# Patient Record
Sex: MALE | Race: WHITE | URBAN - METROPOLITAN AREA
[De-identification: names, ages, dates, MRNs, and addresses within clinical notes are randomized per-mention and may not be internally consistent; named-entity substitution may affect disease eponyms.]

---

## 2024-07-09 ENCOUNTER — APPOINTMENT (OUTPATIENT)
Dept: PRIMARY CARE | Facility: CLINIC | Age: 29
End: 2024-07-09

## 2024-07-15 ENCOUNTER — APPOINTMENT (OUTPATIENT)
Dept: PRIMARY CARE | Facility: CLINIC | Age: 29
End: 2024-07-15
Payer: COMMERCIAL

## 2024-07-15 VITALS
SYSTOLIC BLOOD PRESSURE: 120 MMHG | WEIGHT: 269.6 LBS | TEMPERATURE: 97.3 F | BODY MASS INDEX: 39.93 KG/M2 | DIASTOLIC BLOOD PRESSURE: 80 MMHG | OXYGEN SATURATION: 98 % | HEIGHT: 69 IN | HEART RATE: 95 BPM

## 2024-07-15 DIAGNOSIS — E66.9 OBESITY (BMI 30-39.9): ICD-10-CM

## 2024-07-15 DIAGNOSIS — Z00.00 ANNUAL PHYSICAL EXAM: Primary | ICD-10-CM

## 2024-07-15 LAB
25(OH)D3 SERPL-MCNC: 28 NG/ML (ref 30–100)
ALBUMIN SERPL BCP-MCNC: 5.2 G/DL (ref 3.4–5)
ALP SERPL-CCNC: 67 U/L (ref 33–120)
ALT SERPL W P-5'-P-CCNC: 29 U/L (ref 10–52)
ANION GAP SERPL CALC-SCNC: 19 MMOL/L (ref 10–20)
AST SERPL W P-5'-P-CCNC: 24 U/L (ref 9–39)
BILIRUB SERPL-MCNC: 1 MG/DL (ref 0–1.2)
BUN SERPL-MCNC: 13 MG/DL (ref 6–23)
CALCIUM SERPL-MCNC: 10 MG/DL (ref 8.6–10.6)
CHLORIDE SERPL-SCNC: 101 MMOL/L (ref 98–107)
CHOLEST SERPL-MCNC: 213 MG/DL (ref 0–199)
CHOLESTEROL/HDL RATIO: 3.6
CO2 SERPL-SCNC: 26 MMOL/L (ref 21–32)
CREAT SERPL-MCNC: 1.01 MG/DL (ref 0.5–1.3)
EGFRCR SERPLBLD CKD-EPI 2021: >90 ML/MIN/1.73M*2
ERYTHROCYTE [DISTWIDTH] IN BLOOD BY AUTOMATED COUNT: 12.1 % (ref 11.5–14.5)
EST. AVERAGE GLUCOSE BLD GHB EST-MCNC: 80 MG/DL
GLUCOSE SERPL-MCNC: 94 MG/DL (ref 74–99)
HBA1C MFR BLD: 4.4 %
HCT VFR BLD AUTO: 46.3 % (ref 41–52)
HCV AB SER QL: NONREACTIVE
HDLC SERPL-MCNC: 58.7 MG/DL
HGB BLD-MCNC: 15.8 G/DL (ref 13.5–17.5)
LDLC SERPL CALC-MCNC: 118 MG/DL
MCH RBC QN AUTO: 32.4 PG (ref 26–34)
MCHC RBC AUTO-ENTMCNC: 34.1 G/DL (ref 32–36)
MCV RBC AUTO: 95 FL (ref 80–100)
NON HDL CHOLESTEROL: 154 MG/DL (ref 0–149)
NRBC BLD-RTO: 0 /100 WBCS (ref 0–0)
PLATELET # BLD AUTO: 328 X10*3/UL (ref 150–450)
POTASSIUM SERPL-SCNC: 4.6 MMOL/L (ref 3.5–5.3)
PROT SERPL-MCNC: 7.7 G/DL (ref 6.4–8.2)
RBC # BLD AUTO: 4.88 X10*6/UL (ref 4.5–5.9)
SODIUM SERPL-SCNC: 141 MMOL/L (ref 136–145)
TRIGL SERPL-MCNC: 180 MG/DL (ref 0–149)
TSH SERPL-ACNC: 2.26 MIU/L (ref 0.44–3.98)
VLDL: 36 MG/DL (ref 0–40)
WBC # BLD AUTO: 7.7 X10*3/UL (ref 4.4–11.3)

## 2024-07-15 PROCEDURE — 36415 COLL VENOUS BLD VENIPUNCTURE: CPT

## 2024-07-15 PROCEDURE — 85027 COMPLETE CBC AUTOMATED: CPT

## 2024-07-15 PROCEDURE — 82306 VITAMIN D 25 HYDROXY: CPT

## 2024-07-15 PROCEDURE — 86803 HEPATITIS C AB TEST: CPT

## 2024-07-15 PROCEDURE — 87389 HIV-1 AG W/HIV-1&-2 AB AG IA: CPT

## 2024-07-15 PROCEDURE — 80053 COMPREHEN METABOLIC PANEL: CPT

## 2024-07-15 PROCEDURE — 80061 LIPID PANEL: CPT

## 2024-07-15 PROCEDURE — 1036F TOBACCO NON-USER: CPT | Performed by: NURSE PRACTITIONER

## 2024-07-15 PROCEDURE — 99385 PREV VISIT NEW AGE 18-39: CPT | Performed by: NURSE PRACTITIONER

## 2024-07-15 PROCEDURE — 84443 ASSAY THYROID STIM HORMONE: CPT

## 2024-07-15 PROCEDURE — 83036 HEMOGLOBIN GLYCOSYLATED A1C: CPT

## 2024-07-15 ASSESSMENT — ENCOUNTER SYMPTOMS
PSYCHIATRIC NEGATIVE: 1
CONSTITUTIONAL NEGATIVE: 1
RESPIRATORY NEGATIVE: 1
EYES NEGATIVE: 1
ALLERGIC/IMMUNOLOGIC NEGATIVE: 1
LOSS OF SENSATION IN FEET: 0
ENDOCRINE NEGATIVE: 1
GASTROINTESTINAL NEGATIVE: 1
NEUROLOGICAL NEGATIVE: 1
MUSCULOSKELETAL NEGATIVE: 1
DEPRESSION: 0
HEMATOLOGIC/LYMPHATIC NEGATIVE: 1
CARDIOVASCULAR NEGATIVE: 1
OCCASIONAL FEELINGS OF UNSTEADINESS: 0

## 2024-07-15 ASSESSMENT — PATIENT HEALTH QUESTIONNAIRE - PHQ9
SUM OF ALL RESPONSES TO PHQ9 QUESTIONS 1 AND 2: 0
2. FEELING DOWN, DEPRESSED OR HOPELESS: NOT AT ALL
1. LITTLE INTEREST OR PLEASURE IN DOING THINGS: NOT AT ALL

## 2024-07-15 ASSESSMENT — PAIN SCALES - GENERAL: PAINLEVEL: 0-NO PAIN

## 2024-07-15 NOTE — PROGRESS NOTES
"Subjective   Patient ID: Yaya Oropeza is a 28 y.o. male who presents for New Patient Visit (Est care) and Annual Exam (Pt is fasting ).    HPI   Pleasant 27 y/o male with no PMH. Presents for an annual physical and to establish care, last PCP 3+ yrs ago.    Current concerns   Weight- Feels he's at least 60lbs overweight. BMI 39.8. Following 2300 calorie high protein diet for the last month with strength training 5x a week. Endorses alcohol 5x a week. We discussed the plate method, staying away from processed foods, decreasing alcohol, empty calories, increase cardio to workout routine. Also reviewed muscle, follow clothing fit vs scale. Agrees for a referral to a nutritionist.    Single SO  Works as a  director  Kids no    Diet 2300 calories, 250g protein, 4 meals   Caffeine 1c coffee  Water 120oz, 1 packet of electrolytes daily  Exercise 5x week strength training  Smoker no, nicotine pouches daily  Alcohol 5x week, 10 drinks per week    Eye exam 2023  Dental exam 2024    Family history grandfather skin cancer  Siblings healthy      Review of Systems   Constitutional: Negative.    HENT: Negative.     Eyes: Negative.    Respiratory: Negative.     Cardiovascular: Negative.    Gastrointestinal: Negative.    Endocrine: Negative.    Genitourinary: Negative.    Musculoskeletal: Negative.    Skin: Negative.    Allergic/Immunologic: Negative.    Neurological: Negative.    Hematological: Negative.    Psychiatric/Behavioral: Negative.     All other systems reviewed and are negative.      Objective   /80 (BP Location: Left arm, Patient Position: Sitting)   Pulse 95   Temp 36.3 °C (97.3 °F)   Ht 1.753 m (5' 9\")   Wt 122 kg (269 lb 9.6 oz)   SpO2 98%   BMI 39.81 kg/m²     Physical Exam  Vitals reviewed.   Constitutional:       Appearance: Normal appearance.   HENT:      Head: Normocephalic and atraumatic.      Right Ear: Tympanic membrane and ear canal normal.      Left Ear: Tympanic membrane " and ear canal normal.      Nose: Nose normal.      Mouth/Throat:      Mouth: Mucous membranes are moist.   Eyes:      Extraocular Movements: Extraocular movements intact.      Conjunctiva/sclera: Conjunctivae normal.      Pupils: Pupils are equal, round, and reactive to light.   Cardiovascular:      Rate and Rhythm: Normal rate and regular rhythm.      Pulses: Normal pulses.   Pulmonary:      Effort: Pulmonary effort is normal.      Breath sounds: Normal breath sounds. No wheezing.   Abdominal:      General: Bowel sounds are normal. There is no distension.      Tenderness: There is no abdominal tenderness.   Musculoskeletal:         General: Normal range of motion.      Cervical back: Normal range of motion and neck supple.   Skin:     General: Skin is warm.      Capillary Refill: Capillary refill takes 2 to 3 seconds.   Neurological:      General: No focal deficit present.      Mental Status: He is alert.   Psychiatric:         Mood and Affect: Mood normal.         Assessment/Plan   Problem List Items Addressed This Visit             ICD-10-CM    Obesity (BMI 30-39.9) E66.9     Limit/avoid processed foods.   Increase Cardio   Decrease alcohol         Relevant Orders    Referral to Nutrition Services    Annual physical exam - Primary Z00.00     Fasting labs today.         Relevant Orders    CBC    Comprehensive Metabolic Panel    Vitamin D 25-Hydroxy,Total (for eval of Vitamin D levels)    TSH with reflex to Free T4 if abnormal    Hemoglobin A1C    Lipid Panel    HIV 1/2 Antigen/Antibody Screen with Reflex to Confirmation    Hepatitis C antibody    Referral to Nutrition Services

## 2024-07-15 NOTE — PROGRESS NOTES
I was present with the APRN student who participated in the documentation of this note. I have personally seen and re-examined the patient and performed the medical decision-making components (assessment and plan of care). I have reviewed the APRN student documentation and verified the findings in the note as written with additions or exceptions as stated in the body of this note  Marbella De León [facility]

## 2024-07-15 NOTE — PATIENT INSTRUCTIONS
Labs will be released to My Chart or if you are not connected you will be notified of abnormals only    Health Tips for People in their 20's     Develop good health habits now  Don't put it off. With good health habits, you can prevent or reduce the likelihood of health-impacting conditions later in life, such as obesity, high blood pressure, heart disease, or diabetes. Establishing good health habits now is easier than having to begin these practices later on, or to have to break bad habits.      Establish a relationship with a primary care provider   A PCP can help you on your health journey. When you see a provider on a regular basis, you're more likely to feel comfortable about talking about sensitive issues as well as being receptive to the advice your provider offers, because you develop a trusting relationship. And by getting to know you over time, your doctor may be better able to  on signs of health concerns.      Know your family health history   Knowing your family's health history can help you and your primary care provider better manage your health - and be aware of potential hereditary risks to be watching for.  Things like migraines or even family members with certain cancers and heart attack can increase your risk.       Get regular health screenings and Keep up with immunizations. This includes the HPV vaccine, for men and women.   For women: Monthly Self breast exams, See Gynecology for a Pap smear; HPV screening for the virus that causes genital warts, which can lead to cervical cancer   For men: Monthly Self testicular exams.   For both: sexually transmitted disease testing, if sexually active. Remember to use birth control to prevent and to protect. Talk with your health care provider about the screening schedule that's best for you.    Have good for you people in your life  Its all about a few good friends over a lot of not so good friends. If you are close to your family stay that way,  if not then develop new relationships that help you to grow and thrive. Often people make friends at work, [employer], community groups  Developing and maintaining a work-life balance that allows room for friendships and relationships can have a positive impact on your mental and emotional health.     Be a numbers person  Keep tabs on numbers that affect your health, like weight, blood pressure, the amount of calories you consume, and cholesterol. Your health care provider can help you with this.      Pay attention to the risk of a few extra pounds.  If you gain four or five pounds every year, it doesn't seem like a lot, but at the end of 10 years, you've added 40 or 50 pounds - and in 15 to 20 years, you have 75 to 100 extra pounds that you're carrying!  *Choose a life of healthful eating over trendy diets. The more effective approach: adopt a life-time practice of eating a balanced, nutritional diet that includes vegetables, fruits, lean meats, whole grains, low-fat dairy, nuts and legumes, and non-tropical vegetable oils. And limit sweets.   *Practice portion control. There's more to healthy eating than choosing nutritious food. There's also limiting how much you eat, even if you're eating incredibly healthy food *Remember, your metabolic rate slows as you age. That is, your body becomes less efficient in burning calories.     Stay active   If you're exercising on a regular basis, that is going to help with a lot of health problems that are related to lifestyle.  You don't have to be an athlete, start with a walking program. Even 10 minutes of good exercise is beneficial. Don't forget weight training. Any Weights 3 days a week maintains bone health and helps to burn calories    Get enough sleep  For most that means seven to nine hours a night.   Sleep affects your ability to learn new information and to memorize and process information. Reaction time is adversely affected by too little sleep (a big safety risk  similar to drinking alcohol). In the long run, sleep makes a big difference in how you function and succeed     Mood impacts your overall health  People who are struggling with depression, anxiety, and self-esteem issues really have a lot of difficulty with their health. When depressed, you may not be motivated and may not see the value of taking care of your health. Self Care, Exercise and friendships can help reduce your risk of mental and emotional health issues, and when you need it, your health care provider can help you get professional help.      Don't vape  Vaping is a big problem ,it's not just flavored water, nicotine comes from tobacco so you are in essence still smoking. Also, we don't know about the effects of what's in vaping cartridges, and sometimes they're modified with substances that can cause lung failure. Cigarettes are even worse with known cancer causing chemicals  2 ml of vape juice is equal to 1 pack of cigarettes    Think twice about marijuana  Even in states where marijuana is legal (medically or recreationally) it doesn't mean your employer is going to think it's OK.   Like alcohol, marijuana affects your reasoning, decision-making, and ability to operate equipment safely

## 2024-07-16 LAB — HIV 1+2 AB+HIV1 P24 AG SERPL QL IA: NONREACTIVE

## 2024-10-17 ENCOUNTER — HOSPITAL ENCOUNTER (EMERGENCY)
Facility: HOSPITAL | Age: 29
Discharge: HOME | End: 2024-10-17
Attending: EMERGENCY MEDICINE
Payer: COMMERCIAL

## 2024-10-17 ENCOUNTER — APPOINTMENT (OUTPATIENT)
Dept: RADIOLOGY | Facility: HOSPITAL | Age: 29
End: 2024-10-17
Payer: COMMERCIAL

## 2024-10-17 VITALS
BODY MASS INDEX: 38.51 KG/M2 | OXYGEN SATURATION: 98 % | HEIGHT: 69 IN | TEMPERATURE: 98.8 F | SYSTOLIC BLOOD PRESSURE: 141 MMHG | DIASTOLIC BLOOD PRESSURE: 92 MMHG | WEIGHT: 260 LBS | HEART RATE: 107 BPM | RESPIRATION RATE: 16 BRPM

## 2024-10-17 DIAGNOSIS — B34.9 VIRAL SYNDROME: Primary | ICD-10-CM

## 2024-10-17 LAB
ALBUMIN SERPL BCP-MCNC: 4.6 G/DL (ref 3.4–5)
ALP SERPL-CCNC: 56 U/L (ref 33–120)
ALT SERPL W P-5'-P-CCNC: 75 U/L (ref 10–52)
ANION GAP SERPL CALC-SCNC: 15 MMOL/L (ref 10–20)
APPEARANCE UR: CLEAR
AST SERPL W P-5'-P-CCNC: 65 U/L (ref 9–39)
BASOPHILS # BLD AUTO: 0.04 X10*3/UL (ref 0–0.1)
BASOPHILS NFR BLD AUTO: 0.8 %
BILIRUB SERPL-MCNC: 1.3 MG/DL (ref 0–1.2)
BILIRUB UR STRIP.AUTO-MCNC: NEGATIVE MG/DL
BUN SERPL-MCNC: 12 MG/DL (ref 6–23)
CALCIUM SERPL-MCNC: 9.3 MG/DL (ref 8.6–10.3)
CHLORIDE SERPL-SCNC: 98 MMOL/L (ref 98–107)
CK SERPL-CCNC: 171 U/L (ref 0–325)
CO2 SERPL-SCNC: 28 MMOL/L (ref 21–32)
COLOR UR: YELLOW
CREAT SERPL-MCNC: 1.24 MG/DL (ref 0.5–1.3)
EGFRCR SERPLBLD CKD-EPI 2021: 81 ML/MIN/1.73M*2
EOSINOPHIL # BLD AUTO: 0.01 X10*3/UL (ref 0–0.7)
EOSINOPHIL NFR BLD AUTO: 0.2 %
ERYTHROCYTE [DISTWIDTH] IN BLOOD BY AUTOMATED COUNT: 11.5 % (ref 11.5–14.5)
FLUAV RNA RESP QL NAA+PROBE: NOT DETECTED
FLUBV RNA RESP QL NAA+PROBE: NOT DETECTED
GLUCOSE SERPL-MCNC: 99 MG/DL (ref 74–99)
GLUCOSE UR STRIP.AUTO-MCNC: NORMAL MG/DL
HCT VFR BLD AUTO: 40.8 % (ref 41–52)
HETEROPH AB SERPLBLD QL IA.RAPID: NEGATIVE
HGB BLD-MCNC: 14.8 G/DL (ref 13.5–17.5)
IMM GRANULOCYTES # BLD AUTO: 0.06 X10*3/UL (ref 0–0.7)
IMM GRANULOCYTES NFR BLD AUTO: 1.1 % (ref 0–0.9)
KETONES UR STRIP.AUTO-MCNC: ABNORMAL MG/DL
LEUKOCYTE ESTERASE UR QL STRIP.AUTO: NEGATIVE
LYMPHOCYTES # BLD AUTO: 1.37 X10*3/UL (ref 1.2–4.8)
LYMPHOCYTES NFR BLD AUTO: 26 %
MCH RBC QN AUTO: 33 PG (ref 26–34)
MCHC RBC AUTO-ENTMCNC: 36.3 G/DL (ref 32–36)
MCV RBC AUTO: 91 FL (ref 80–100)
MONOCYTES # BLD AUTO: 0.81 X10*3/UL (ref 0.1–1)
MONOCYTES NFR BLD AUTO: 15.4 %
NEUTROPHILS # BLD AUTO: 2.98 X10*3/UL (ref 1.2–7.7)
NEUTROPHILS NFR BLD AUTO: 56.5 %
NITRITE UR QL STRIP.AUTO: NEGATIVE
NRBC BLD-RTO: 0 /100 WBCS (ref 0–0)
PH UR STRIP.AUTO: 6 [PH]
PLATELET # BLD AUTO: 176 X10*3/UL (ref 150–450)
POTASSIUM SERPL-SCNC: 4.6 MMOL/L (ref 3.5–5.3)
PROT SERPL-MCNC: 7.3 G/DL (ref 6.4–8.2)
PROT UR STRIP.AUTO-MCNC: NEGATIVE MG/DL
RBC # BLD AUTO: 4.48 X10*6/UL (ref 4.5–5.9)
RBC # UR STRIP.AUTO: NEGATIVE /UL
RSV RNA RESP QL NAA+PROBE: NOT DETECTED
S PYO DNA THROAT QL NAA+PROBE: NOT DETECTED
SARS-COV-2 RNA RESP QL NAA+PROBE: NOT DETECTED
SODIUM SERPL-SCNC: 136 MMOL/L (ref 136–145)
SP GR UR STRIP.AUTO: 1.01
UROBILINOGEN UR STRIP.AUTO-MCNC: NORMAL MG/DL
WBC # BLD AUTO: 5.3 X10*3/UL (ref 4.4–11.3)

## 2024-10-17 PROCEDURE — 80053 COMPREHEN METABOLIC PANEL: CPT | Performed by: PHYSICIAN ASSISTANT

## 2024-10-17 PROCEDURE — 71046 X-RAY EXAM CHEST 2 VIEWS: CPT | Performed by: RADIOLOGY

## 2024-10-17 PROCEDURE — 87651 STREP A DNA AMP PROBE: CPT | Performed by: PHYSICIAN ASSISTANT

## 2024-10-17 PROCEDURE — 99283 EMERGENCY DEPT VISIT LOW MDM: CPT | Mod: 25

## 2024-10-17 PROCEDURE — 87637 SARSCOV2&INF A&B&RSV AMP PRB: CPT | Performed by: PHYSICIAN ASSISTANT

## 2024-10-17 PROCEDURE — 71046 X-RAY EXAM CHEST 2 VIEWS: CPT

## 2024-10-17 PROCEDURE — 82550 ASSAY OF CK (CPK): CPT

## 2024-10-17 PROCEDURE — 85025 COMPLETE CBC W/AUTO DIFF WBC: CPT | Performed by: PHYSICIAN ASSISTANT

## 2024-10-17 PROCEDURE — 2500000001 HC RX 250 WO HCPCS SELF ADMINISTERED DRUGS (ALT 637 FOR MEDICARE OP)

## 2024-10-17 PROCEDURE — 81003 URINALYSIS AUTO W/O SCOPE: CPT

## 2024-10-17 PROCEDURE — 36415 COLL VENOUS BLD VENIPUNCTURE: CPT | Performed by: PHYSICIAN ASSISTANT

## 2024-10-17 PROCEDURE — 86308 HETEROPHILE ANTIBODY SCREEN: CPT | Performed by: PHYSICIAN ASSISTANT

## 2024-10-17 RX ORDER — ACETAMINOPHEN 325 MG/1
650 TABLET ORAL ONCE
Status: COMPLETED | OUTPATIENT
Start: 2024-10-17 | End: 2024-10-17

## 2024-10-17 RX ADMIN — ACETAMINOPHEN 650 MG: 325 TABLET, FILM COATED ORAL at 19:16

## 2024-10-17 ASSESSMENT — COLUMBIA-SUICIDE SEVERITY RATING SCALE - C-SSRS
1. IN THE PAST MONTH, HAVE YOU WISHED YOU WERE DEAD OR WISHED YOU COULD GO TO SLEEP AND NOT WAKE UP?: NO
2. HAVE YOU ACTUALLY HAD ANY THOUGHTS OF KILLING YOURSELF?: NO
6. HAVE YOU EVER DONE ANYTHING, STARTED TO DO ANYTHING, OR PREPARED TO DO ANYTHING TO END YOUR LIFE?: NO

## 2024-10-17 ASSESSMENT — LIFESTYLE VARIABLES
TOTAL SCORE: 0
EVER FELT BAD OR GUILTY ABOUT YOUR DRINKING: NO
HAVE PEOPLE ANNOYED YOU BY CRITICIZING YOUR DRINKING: NO
EVER HAD A DRINK FIRST THING IN THE MORNING TO STEADY YOUR NERVES TO GET RID OF A HANGOVER: NO
HAVE YOU EVER FELT YOU SHOULD CUT DOWN ON YOUR DRINKING: NO

## 2024-10-17 ASSESSMENT — PAIN DESCRIPTION - DESCRIPTORS: DESCRIPTORS: SORE

## 2024-10-17 ASSESSMENT — PAIN - FUNCTIONAL ASSESSMENT: PAIN_FUNCTIONAL_ASSESSMENT: 0-10

## 2024-10-17 ASSESSMENT — PAIN SCALES - GENERAL: PAINLEVEL_OUTOF10: 5 - MODERATE PAIN

## 2024-10-17 ASSESSMENT — PAIN DESCRIPTION - PAIN TYPE: TYPE: ACUTE PAIN

## 2024-10-17 ASSESSMENT — PAIN DESCRIPTION - LOCATION: LOCATION: NECK

## 2024-10-17 NOTE — ED PROVIDER NOTES
History of Present Illness     History provided by: Patient  Limitations to History: None  External Records Reviewed with Brief Summary: None    HPI:  Yaya Oropeza is a 29 y.o. male past medical history that presents emergency room for fever.  States that for the past 4 days he has been having elevated fevers as high as 101.  Patient states that prior to arriving to the emergency room, patient was seen by urgent care where he was found to have a fever of 103.  The urgent care he did not receive any Tylenol.  Recommended to come to the emergency room for evaluation.  Patient has had decreased appetite over the past 4 days.  Patient admits to chills.  Patient denies any cough, congestion, runny nose, sore throat, shortness of breath, chest pain.  States that at the urgent care he tested negative for influenza.  Patient additionally states that he has been having some increased neck stiffness and pain.  Patient does state that he has chronic neck pain and this feels slightly different.  On examination patient is not tender to palpation.  Denies any dysuria, hematuria, increased frequency however states that he has had dark urine for the past day.  Patient states that he has had aches and pains of his upper extremity but denies any lower extremity tenderness.  Patient denies any numbness and tingling of the lower extremities.  Pt denies any back pain or IV drug use.    Physical Exam   Triage vitals:  T (!) 38.1 °C (100.6 °F)  HR (!) 118  /87  RR 16  O2 97 % None (Room air)    General: Awake, alert, in no acute distress  Eyes: Gaze conjugate.  No scleral icterus or injection  HENT: Normo-cephalic, atraumatic. No stridor, no tenderness to palpation of the neck, Brudzinski's negative  CV: Tachycardic rate, regular rhythm. Radial pulses 2+ bilaterally  Resp: Breathing non-labored, speaking in full sentences.  Clear to auscultation bilaterally  GI: Soft, non-distended, non-tender. No rebound or  guarding.  MSK/Extremities: No gross bony deformities. Moving all extremities  Skin: Warm. Appropriate color, skin noted over his neck however no rashes visualized.  Neuro: Alert. Oriented. Face symmetric. Speech is fluent.  Gross strength and sensation intact in b/l UE and LEs  Psych: Appropriate mood and affect    Medical Decision Making & ED Course   Medical Decision Making:  Yaya Oropeza is a 29 y.o. male past medical history that presents emergency room for fever.  Differential diagnosis includes influenza, COVID, pneumonia, meningitis, rhabdomyolysis, urinary tract infection, group A strep, RSV, mononucleosis.  Urinalysis, CBC, creatinine kinase, mononucleosis, CMP, COVID, influenza, RSV, group a strep, chest x-ray was ordered.  Pt is febrile at 100.6, heart rate of 118, respiratory rate of 16 with satting at 97% on room air.  Ordered Tylenol 650 mg for the fever.  CBC is unremarkable.  COVID, influenza negative.  RSV is negative, group A strep is negative.  Chest x-ray, CK, mononucleosis, CMP is pending along with urinalysis.  Patient signed out to Dr. Esparza for follow-up of rest of labs along with chest x-ray.  Likely to be discharged given the patient was young, no past medical history, would recommend follow-up labs to have confirmed the patient does not have rhabdomyolysis.   ----  Scoring Tools Utilized: None       Social Determinants of Health which Significantly Impact Care: None identified The following actions were taken to address these social determinants: None    EKG Independent Interpretation: EKG not obtained    Independent Result Review and Interpretation: Relevant laboratory and radiographic results were reviewed and independently interpreted by myself.  As necessary, they are commented on in the ED Course.    Chronic conditions affecting the patient's care: As documented above in MDM    The patient was discussed with the following consultants/services: None    Care Considerations: As  documented above in MDM    ED Course:  Diagnoses as of 10/17/24 1941   Viral syndrome     Disposition   Patient was signed out to Dr. Vega at 7pm pending completion of their work-up.  Please see the next provider's transition of care note for the remainder of the patient's care.     Procedures   Procedures    Patient seen and discussed with ED attending physician.    Artur Vega MD  Emergency Medicine     Rubia Ocampo MD  Resident  10/17/24 1914       Artur Vega MD  10/17/24 1941

## 2024-10-17 NOTE — ED TRIAGE NOTES
PT. C/O FEVER AND STIFF NECK SINCE MONDAY, STATES PAIN RADIATES TO UPPER BACK ALSO, DECREASED APPETITE. PT. STATES SENT FROM [facility]. DENIES COUGH. PT. MOVING NECK WELL IN TRIAGE.

## 2024-10-18 LAB — HOLD SPECIMEN: NORMAL

## 2024-12-31 ENCOUNTER — PATIENT OUTREACH (OUTPATIENT)
Dept: CARE COORDINATION | Facility: CLINIC | Age: 29
End: 2024-12-31
Payer: COMMERCIAL

## 2024-12-31 NOTE — PROGRESS NOTES
Left voicemail requesting return call regarding referral for MNT related to obesity. Name and contact info provided for return call.

## 2025-01-13 ENCOUNTER — PATIENT OUTREACH (OUTPATIENT)
Dept: CARE COORDINATION | Facility: CLINIC | Age: 30
End: 2025-01-13
Payer: COMMERCIAL

## 2025-01-13 NOTE — PROGRESS NOTES
2nd call attempt; left message requesting return call if interested to schedule per referral from SHAGGY De León.  Name and contact info provided for return call.  Will close referral if no response by 1/17

## 2025-01-20 ENCOUNTER — DOCUMENTATION (OUTPATIENT)
Dept: CARE COORDINATION | Facility: CLINIC | Age: 30
End: 2025-01-20
Payer: COMMERCIAL

## 2025-01-20 NOTE — PROGRESS NOTES
"No response from patient from Outreach attempts on 12/31 and 1/13.  Referral closed due to \"unable to reach patient\".  "